# Patient Record
Sex: MALE | Employment: FULL TIME | URBAN - METROPOLITAN AREA
[De-identification: names, ages, dates, MRNs, and addresses within clinical notes are randomized per-mention and may not be internally consistent; named-entity substitution may affect disease eponyms.]

---

## 2022-06-02 ENCOUNTER — EVALUATION (OUTPATIENT)
Dept: PHYSICAL THERAPY | Facility: CLINIC | Age: 32
End: 2022-06-02
Payer: COMMERCIAL

## 2022-06-02 DIAGNOSIS — M25.562 ACUTE PAIN OF LEFT KNEE: ICD-10-CM

## 2022-06-02 DIAGNOSIS — M17.12 PRIMARY OSTEOARTHRITIS OF LEFT KNEE: Primary | ICD-10-CM

## 2022-06-02 PROCEDURE — 97161 PT EVAL LOW COMPLEX 20 MIN: CPT | Performed by: PHYSICAL THERAPIST

## 2022-06-02 PROCEDURE — 97110 THERAPEUTIC EXERCISES: CPT | Performed by: PHYSICAL THERAPIST

## 2022-06-02 NOTE — PROGRESS NOTES
PT Evaluation     Today's date: 2022  Patient name: [facility]  : 1990  MRN: [id number]  Referring provider: Jessica Garsia PA-C  Dx:   Encounter Diagnosis     ICD-10-CM    1  Primary osteoarthritis of left knee  M17 12    2  Acute pain of left knee  M25 562        Start Time: 1730  Stop Time: 1820  Total time in clinic (min): 50 minutes    Assessment  Assessment details: 33year old with 3 month hx L knee pain  X-ray + for OA, MRI + for meniscal tear  Pain with squatting, jogging, jumping and steps  Pain range 0-7/10  Strength 5/5 L knee  B squat 50% due to pain  Pain medial knee over MCL/pes anserine  L knee patellar mobility limited> + Dave with medial tibial rotation, + patellar compression  Initiated open chain quad ex and HS/quad stretching  Pt would benefit from a course of outpatient PT with goal of decreasing pain and restorring function  Impairments: activity intolerance, lacks appropriate home exercise program and pain with function  Functional limitations: twist/squat/bend painful; steps painful; difficulty sleeping  Symptom irritability: moderateUnderstanding of Dx/Px/POC: fair   Prognosis: good    Goals  STG- 4 weeks 22  1  I HEP  2 Decrease pain range to 0-4/10  3  Improve squat to 75% without pain  4  Ascend /descend steps without pain    LTG- 8 weeks 22  1  Improve pain range to 0-2/10  2  Walk, jog, jump, squat without pain  3  Work as  with minimal pain  4   Improve LEFS 5-10 points    Plan  Patient would benefit from: PT eval and skilled physical therapy  Referral necessary: No  Planned modality interventions: cryotherapy  Planned therapy interventions: manual therapy, therapeutic activities, therapeutic exercise and home exercise program  Frequency: 1x week  Duration in visits: 1  Duration in weeks: 8  Plan of Care beginning date: 2022  Plan of Care expiration date: 2022  Treatment plan discussed with: patient        Subjective Evaluation    History of Present Illness  Date of onset: 2/15/2022  Mechanism of injury: Pt reports onset of L pain several months   Pt states knee feels like it will give out at times, and has difficulty WB  Pain at work as  when jumping on and off truck  X-ray L knee showed arthritis, MRI revealed meniscal tear  Pt referred for outpatient PT  Not a recurrent problem   Quality of life: good    Pain  Current pain ratin  At best pain ratin  At worst pain ratin  Quality: dull ache and tight  Relieving factors: rest  Aggravating factors: stair climbing and running  Progression: resolved    Social Support  Steps to enter house: yes  2  Stairs in house: no   Lives in: [facility]  Lives with: spouse and young children    Employment status: working ()  Hand dominance: right      Diagnostic Tests  X-ray: abnormal  MRI studies: abnormal  Treatments  Current treatment: physical therapy  Patient Goals  Patient goals for therapy: decreased pain, increased strength, independence with ADLs/IADLs, return to sport/leisure activities and increased motion  Patient goal: decrease pain        Objective     Active Range of Motion   Left Knee   Flexion: 135 degrees     Right Knee   Flexion: 140 degrees     Mobility   Patellar Mobility:   Left Knee   Hypomobile: left medial, left lateral, left superior and left inferior    Right Knee   WFL: medial, lateral, superior and inferior    Strength/Myotome Testing     Left Knee   Normal strength    Right Knee   Normal strength    Additional Strength Details  All hip and knee strength L LE is WNL with exception of L hip abd 4/5    HT walk intact, B squat 50% due to pain    Tests     Left Knee   Positive medial Dave and patella-femoral grind  Negative posterior drawer and posterior Lachman       Ambulation   Weight-Bearing Status   Weight-Bearing Status (Left): full weight bearing   Weight-Bearing Status (Right): full weight-bearing    Assistive device used: none    Ambulation: Stairs   Ascend stairs: independent  Pattern: reciprocal  Railings: one rail  Descend stairs: independent  Pattern: reciprocal  Railings: one rail             Precautions: OA L knee      Manuals 6/2/22            STM pes anserine/HS  medially CRR            Patellar mobs CRR                                      Neuro Re-Ed                                                                                                        Ther Ex             QS  SLR  SAQ 15x  15x  15x              HT raises  squats             vectors             HS stretch  Quad stretch 2 x 15"  2 x 15"                                                                Ther Activity             Stationary bike             Leg press                                                    Modalities

## 2022-06-02 NOTE — LETTER
Thuy 3, 2022    Khanh Donovan PA-C  [address]    Patient: Xiomara Negron   YOB: 1990   Date of Visit: 2022     Encounter Diagnosis     ICD-10-CM    1  Primary osteoarthritis of left knee  M17 12    2  Acute pain of left knee  M25 562        Dear Dr Anum Arguello: Thank you for your recent referral of Xiomara Negron  Please review the attached evaluation summary from Sherif's recent visit  Please verify that you agree with the plan of care by signing the attached order  If you have any questions or concerns, please do not hesitate to call  I sincerely appreciate the opportunity to share in the care of one of your patients and hope to have another opportunity to work with you in the near future  Sincerely,    Alverto Rapp, PT      Referring Provider:      I certify that I have read the below Plan of Care and certify the need for these services furnished under this plan of treatment while under my care  Khanh Donovan PA-C  [address]  [address] [place] [address]  Via Fax: [phone]          PT Evaluation     Today's date: 2022  Patient name: Xiomara Negron  : 1990  MRN: [id number]  Referring provider: Jean Carlos Zhong PA-C  Dx:   Encounter Diagnosis     ICD-10-CM    1  Primary osteoarthritis of left knee  M17 12    2  Acute pain of left knee  M25 562        Start Time: 1730  Stop Time: 1820  Total time in clinic (min): 50 minutes    Assessment  Assessment details: 33year old with 3 month hx L knee pain  X-ray + for OA, MRI + for meniscal tear  Pain with squatting, jogging, jumping and steps  Pain range 0-7/10  Strength 5/5 L knee  B squat 50% due to pain  Pain medial knee over MCL/pes anserine  L knee patellar mobility limited> + Dave with medial tibial rotation, + patellar compression  Initiated open chain quad ex and HS/quad stretching    Pt would benefit from a course of outpatient PT with goal of decreasing pain and restorring function  Impairments: activity intolerance, lacks appropriate home exercise program and pain with function  Functional limitations: twist/squat/bend painful; steps painful; difficulty sleeping  Symptom irritability: moderateUnderstanding of Dx/Px/POC: fair   Prognosis: good    Goals  STG- 4 weeks 22  1  I HEP  2 Decrease pain range to 0-4/10  3  Improve squat to 75% without pain  4  Ascend /descend steps without pain    LTG- 8 weeks 22  1  Improve pain range to 0-2/10  2  Walk, jog, jump, squat without pain  3  Work as  with minimal pain  4  Improve LEFS 5-10 points    Plan  Patient would benefit from: PT eval and skilled physical therapy  Referral necessary: No  Planned modality interventions: cryotherapy  Planned therapy interventions: manual therapy, therapeutic activities, therapeutic exercise and home exercise program  Frequency: 1x week  Duration in visits: 1  Duration in weeks: 8  Plan of Care beginning date: 2022  Plan of Care expiration date: 2022  Treatment plan discussed with: patient        Subjective Evaluation    History of Present Illness  Date of onset: 2/15/2022  Mechanism of injury: Pt reports onset of L pain several months   Pt states knee feels like it will give out at times, and has difficulty WB  Pain at work as  when jumping on and off truck  X-ray L knee showed arthritis, MRI revealed meniscal tear  Pt referred for outpatient PT            Not a recurrent problem   Quality of life: good    Pain  Current pain ratin  At best pain ratin  At worst pain ratin  Quality: dull ache and tight  Relieving factors: rest  Aggravating factors: stair climbing and running  Progression: resolved    Social Support  Steps to enter house: yes  2  Stairs in house: no   Lives in: [facility]  Lives with: spouse and young children    Employment status: working ()  Hand dominance: right      Diagnostic Tests  X-ray: abnormal  MRI studies: abnormal  Treatments  Current treatment: physical therapy  Patient Goals  Patient goals for therapy: decreased pain, increased strength, independence with ADLs/IADLs, return to sport/leisure activities and increased motion  Patient goal: decrease pain        Objective     Active Range of Motion   Left Knee   Flexion: 135 degrees     Right Knee   Flexion: 140 degrees     Mobility   Patellar Mobility:   Left Knee   Hypomobile: left medial, left lateral, left superior and left inferior    Right Knee   WFL: medial, lateral, superior and inferior    Strength/Myotome Testing     Left Knee   Normal strength    Right Knee   Normal strength    Additional Strength Details  All hip and knee strength L LE is WNL with exception of L hip abd 4/5    HT walk intact, B squat 50% due to pain    Tests     Left Knee   Positive medial Dave and patella-femoral grind  Negative posterior drawer and posterior Lachman       Ambulation   Weight-Bearing Status   Weight-Bearing Status (Left): full weight bearing   Weight-Bearing Status (Right): full weight-bearing    Assistive device used: none    Ambulation: Stairs   Ascend stairs: independent  Pattern: reciprocal  Railings: one rail  Descend stairs: independent  Pattern: reciprocal  Railings: one rail             Precautions: OA L knee      Manuals 6/2/22            STM pes anserine/HS  medially CRR            Patellar mobs CRR                                      Neuro Re-Ed                                                                                                        Ther Ex             QS  SLR  SAQ 15x  15x  15x              HT raises  squats             vectors             HS stretch  Quad stretch 2 x 15"  2 x 15"                                                                Ther Activity             Stationary bike             Leg press                                                    Modalities

## 2022-06-09 ENCOUNTER — OFFICE VISIT (OUTPATIENT)
Dept: PHYSICAL THERAPY | Facility: CLINIC | Age: 32
End: 2022-06-09
Payer: COMMERCIAL

## 2022-06-09 DIAGNOSIS — M17.12 PRIMARY OSTEOARTHRITIS OF LEFT KNEE: Primary | ICD-10-CM

## 2022-06-09 DIAGNOSIS — M25.562 ACUTE PAIN OF LEFT KNEE: ICD-10-CM

## 2022-06-09 PROCEDURE — 97530 THERAPEUTIC ACTIVITIES: CPT

## 2022-06-09 PROCEDURE — 97110 THERAPEUTIC EXERCISES: CPT

## 2022-06-09 NOTE — PROGRESS NOTES
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, but stepping on/off truck when it stops  Objective: See treatment diary below      Assessment:  Pt jimy exer well-some VPs for technique  No inc pain with exer-though still with some L pos knee general soreness  Initiated standing vectors and shallow squats as well as sitting double and single leg press  Patient would benefit from continued PT      Plan: Continue per plan of care        Precautions: OA L knee      Manuals 22           STM pes anserine/HS  medially CRR            Patellar mobs CRR                                      Neuro Re-Ed                                                                                                                     Ther Ex             HC stretch  - 3x20"           QS  SLR  SAQ 15x  15x  15x   x20 ea           HT raises  squats  -  x20 (shallow)           vectors - GTBx 10 ea side           HS stretch  Quad stretch 2 x 15"  2 x 15" 2 x 15"  2 x 15"                                                               Ther Activity             Stationary bike-seat 17 - x5'           Leg press  -DL 50# x20  -UL R/L 30# x20                                                  Modalities

## 2022-06-16 ENCOUNTER — OFFICE VISIT (OUTPATIENT)
Dept: PHYSICAL THERAPY | Facility: CLINIC | Age: 32
End: 2022-06-16
Payer: COMMERCIAL

## 2022-06-16 DIAGNOSIS — M17.12 PRIMARY OSTEOARTHRITIS OF LEFT KNEE: ICD-10-CM

## 2022-06-16 DIAGNOSIS — M25.562 ACUTE PAIN OF LEFT KNEE: Primary | ICD-10-CM

## 2022-06-16 PROCEDURE — 97530 THERAPEUTIC ACTIVITIES: CPT | Performed by: PHYSICAL THERAPIST

## 2022-06-16 PROCEDURE — 97140 MANUAL THERAPY 1/> REGIONS: CPT | Performed by: PHYSICAL THERAPIST

## 2022-06-16 PROCEDURE — 97110 THERAPEUTIC EXERCISES: CPT | Performed by: PHYSICAL THERAPIST

## 2022-06-16 NOTE — PROGRESS NOTES
Daily Note     Today's date: 2022  Patient name: Alli Pelayo  : 1990  MRN: [id number]  Referring provider: Noah Kang PA-C  Dx:   Encounter Diagnosis     ICD-10-CM    1  Acute pain of left knee  M25 562    2  Primary osteoarthritis of left knee  M17 12                   Subjective: Less painful, pain free most of the time, @ worst 2/10  Not running and less pain at work  Objective: See treatment diary below      Assessment:  Progressing with close chain strengthening  Some pain with WB L knee with sustained knee flx  In general less apin with activity than 1 month ago  Pain medial knee at pes anserine/ MCL  [place] PT with goal of less pain with activity  Plan: Progress treatment as tolerated         Precautions: OA L knee      Manuals 22          STM pes anserine/HS  medially CRR  CRR          Patellar mobs CRR  CRR                                    Neuro Re-Ed                                                                                                                     Ther Ex             HC stretch  - 3x20" 2 x 30"          QS  SLR  SAQ 15x  15x  15x   x20 ea 20x all          HT raises  squats  -  x20 (shallow) 2 x 10 squats          vectors - GTBx 10 ea side 10x L/R          HS stretch  Quad stretch 2 x 15"  2 x 15" 2 x 15"  2 x 15" 2 x 15"  2 x 15"                                                              Ther Activity             Stationary bike-seat 17 - x5' 5' L1 seat 18          Leg press  -DL 50# x20  -UL R/L 30# x20 DL 60#  2 x 20  SL 40#  2 x 20            TM   Reverse 1 mph 3 min  5% grade 5'          Step up 6"   Up/down/side 10x L          Foam over/side   10x all          Modalities

## 2022-06-23 ENCOUNTER — OFFICE VISIT (OUTPATIENT)
Dept: PHYSICAL THERAPY | Facility: CLINIC | Age: 32
End: 2022-06-23
Payer: COMMERCIAL

## 2022-06-23 DIAGNOSIS — M25.562 ACUTE PAIN OF LEFT KNEE: ICD-10-CM

## 2022-06-23 DIAGNOSIS — M17.12 PRIMARY OSTEOARTHRITIS OF LEFT KNEE: Primary | ICD-10-CM

## 2022-06-23 PROCEDURE — 97140 MANUAL THERAPY 1/> REGIONS: CPT | Performed by: PHYSICAL THERAPIST

## 2022-06-23 PROCEDURE — 97110 THERAPEUTIC EXERCISES: CPT | Performed by: PHYSICAL THERAPIST

## 2022-06-23 PROCEDURE — 97530 THERAPEUTIC ACTIVITIES: CPT | Performed by: PHYSICAL THERAPIST

## 2022-06-23 NOTE — PROGRESS NOTES
Daily Note     Today's date: 2022  Patient name: Ravi Simpson  : 1990  MRN: [id number]  Referring provider: Pedro Pandey PA-C  Dx:   Encounter Diagnosis     ICD-10-CM    1  Primary osteoarthritis of left knee  M17 12    2  Acute pain of left knee  M25 562        Start Time: 1730  Stop Time: 1820  Total time in clinic (min): 50 minutes    Subjective: Pain free L knee currently, @ worst 2/10   "I've been more careful with my knees "    Objective: See treatment diary below      Assessment: Progressing with open and close chain strengthening  Foto 60  LEFS 53/80  Significant crepitus noted L knee with activity, no pain with creptus today  Continue PT progressing with strengthening as tolerated with goal of resuming all activity with minimal pain  Plan: Progress treatment as tolerated         Precautions: OA L knee      Manuals 22         STM pes anserine/HS  medially CRR  CRR CRR         Patellar mobs CRR  CRR CRR                          FOTO         Neuro Re-Ed                                                                                                                     Ther Ex             HC stretch  - 3x20" 2 x 30" 2 x 30"         QS  SLR  SAQ 15x  15x  15x   x20 ea 20x all 20x all  SLR 20x 1#  SAQ 30x 3#         HT raises  squats  -  x20 (shallow) 2 x 10 squats 2 x 10 squats         vectors - GTBx 10 ea side 10x L/R 10x L /R         HS stretch  Quad stretch 2 x 15"  2 x 15" 2 x 15"  2 x 15" 2 x 15"  2 x 15" 2 x 30"  2 x 30"                                                             Ther Activity             Stationary bike-seat 17 - x5' 5' L1 seat 18 5' L2         Leg press  -DL 50# x20  -UL R/L 30# x20 DL 60#  2 x 20  SL 40#  2 x 20   DL 70# 2 x 20  SL 40# L/R  2 x 20         TM   Reverse 1 mph 3 min  5% grade 5' Reverse 3 min 1 2 mph  forward 5% grade 2 5 mph         Step up 6"   Up/down/side 10x L L 10x up/side          Foam over/side   10x all 10x all Modalities

## 2022-06-30 ENCOUNTER — EVALUATION (OUTPATIENT)
Dept: PHYSICAL THERAPY | Facility: CLINIC | Age: 32
End: 2022-06-30
Payer: COMMERCIAL

## 2022-06-30 DIAGNOSIS — M17.12 PRIMARY OSTEOARTHRITIS OF LEFT KNEE: ICD-10-CM

## 2022-06-30 DIAGNOSIS — M25.562 ACUTE PAIN OF LEFT KNEE: Primary | ICD-10-CM

## 2022-06-30 PROCEDURE — 97530 THERAPEUTIC ACTIVITIES: CPT | Performed by: PHYSICAL THERAPIST

## 2022-06-30 PROCEDURE — 97140 MANUAL THERAPY 1/> REGIONS: CPT | Performed by: PHYSICAL THERAPIST

## 2022-06-30 PROCEDURE — 97164 PT RE-EVAL EST PLAN CARE: CPT | Performed by: PHYSICAL THERAPIST

## 2022-06-30 PROCEDURE — 97110 THERAPEUTIC EXERCISES: CPT | Performed by: PHYSICAL THERAPIST

## 2022-06-30 NOTE — LETTER
2022    Marie River PA-C  [address]    Patient: Ariane Lopez   YOB: 1990   Date of Visit: 2022     Encounter Diagnosis     ICD-10-CM    1  Acute pain of left knee  M25 562    2  Primary osteoarthritis of left knee  M17 12        Dear Dr Jenniffer Stern: Thank you for your recent referral of Ariane Lopez  Please review the attached evaluation summary from Sherif's recent visit  Please verify that you agree with the plan of care by signing the attached order  If you have any questions or concerns, please do not hesitate to call  I sincerely appreciate the opportunity to share in the care of one of your patients and hope to have another opportunity to work with you in the near future  Sincerely,    Diego Argueta, PT      Referring Provider:      I certify that I have read the below Plan of Care and certify the need for these services furnished under this plan of treatment while under my care  Marie River PA-C  [email or web address]  [address] [place] [address]  Via Fax: [phone]          PT Re-Evaluation     Today's date: 2022  Patient name: Ariane Lopez  : 1990  MRN: [id number]  Referring provider: Angel Mahajan PA-C  Dx:   Encounter Diagnosis     ICD-10-CM    1  Acute pain of left knee  M25 562    2  Primary osteoarthritis of left knee  M17 12        Start Time: 1730  Stop Time: 1815  Total time in clinic (min): 45 minutes    Assessment  Assessment details: 33year old with 3 month hx L knee pain  X-ray + for OA, MRI + for meniscal tear  Pain improved, range 0-5/10  Less painful with squats, steps; does not wake with knee pain  Pt has adapted his activity with less steps, less squatting  Pt noting less tenderness  Strength L knee is 5/5, pain with squat deeper than 50%  Pt to see MD for follow--up    Has been instructed in HEP, and will taper to DC from formal PT   Impairments: activity intolerance, lacks appropriate home exercise program and pain with function  Functional limitations: twist/squat/bend painful; steps painful; difficulty sleeping  Symptom irritability: moderateUnderstanding of Dx/Px/POC: fair   Prognosis: good    Goals  STG- 4 weeks 22  1  I HEP (MET)  2 Decrease pain range to 0-4/10 (progressing)  3  Improve squat to 75% without pain (not met)  4  Ascend /descend steps without pain (not met)    LTG- 8 weeks 22  1  Improve pain range to 0-2/10  2  Walk, jog, jump, squat without pain  3  Work as  with minimal pain  4  Improve LEFS 5-10 points    Plan  Patient would benefit from: PT eval and skilled physical therapy  Referral necessary: No  Planned modality interventions: cryotherapy  Planned therapy interventions: manual therapy, therapeutic activities, therapeutic exercise and home exercise program  Frequency: 1x week  Duration in visits: 1  Duration in weeks: 4  Plan of Care beginning date: 2022  Plan of Care expiration date: 2022  Treatment plan discussed with: patient        Subjective Evaluation    History of Present Illness  Date of onset: 2/15/2022  Mechanism of injury: 22- Some improvement noted, patient states he changed his activity level  Pain range 0-5/10    Pt reports onset of L pain several months   Pt states knee feels like it will give out at times, and has difficulty WB  Pain at work as  when jumping on and off truck  X-ray L knee showed arthritis, MRI revealed meniscal tear  Pt referred for outpatient PT            Not a recurrent problem   Quality of life: good    Pain  Current pain ratin  At best pain ratin  At worst pain ratin  Quality: dull ache and tight  Relieving factors: rest  Aggravating factors: stair climbing and running  Progression: resolved    Social Support  Steps to enter house: yes  2  Stairs in house: no   Lives in: [facility]  Lives with: spouse and young children    Employment status: working ()  Hand dominance: right      Diagnostic Tests  X-ray: abnormal  MRI studies: abnormal  Treatments  Current treatment: physical therapy  Patient Goals  Patient goals for therapy: decreased pain, increased strength, independence with ADLs/IADLs, return to sport/leisure activities and increased motion  Patient goal: decrease pain        Objective     Tenderness     Additional Tenderness Details  Painful medial L knee over MCL and pes anserine    Active Range of Motion   Left Knee   Flexion: 138 degrees     Right Knee   Flexion: 140 degrees     Mobility   Patellar Mobility:   Left Knee   WFL: medial and lateral    Hypomobile: left superior and left inferior    Right Knee   WFL: medial, lateral, superior and inferior    Strength/Myotome Testing     Left Knee   Normal strength    Right Knee   Normal strength    Additional Strength Details  All hip and knee strength L LE is WNL, L hip abd improved to 5/5  HT walk intact, B squat 50% due to pain    Tests     Left Knee   Positive medial Dave and patella-femoral grind  Negative posterior drawer and posterior Lachman       Ambulation   Weight-Bearing Status   Weight-Bearing Status (Left): full weight bearing   Weight-Bearing Status (Right): full weight-bearing    Assistive device used: none    Ambulation: Stairs   Ascend stairs: independent  Pattern: reciprocal  Railings: one rail  Descend stairs: independent  Pattern: reciprocal  Railings: one rail             Precautions: OA L knee      Manuals 6/2/22 6/9/22 6/16 6/23 6/30        STM pes anserine/HS  medially CRR  CRR CRR CRR        Patellar mobs CRR  CRR CRR CRR                         FOTO RE        Neuro Re-Ed                                                                                                                     Ther Ex             HC stretch  - 3x20" 2 x 30" 2 x 30" 2 x 30"        QS  SLR  SAQ 15x  15x  15x   x20 ea 20x all 20x all  SLR 20x 1#  SAQ 30x 3# 20x all    SLR 20x 2#  SAQ 20x 4#        HT raises  squats  -  x20 (shallow) 2 x 10 squats 2 x 10 squats squats 2 x 10        vectors - GTBx 10 ea side 10x L/R 10x L /R 10x        HS stretch  Quad stretch 2 x 15"  2 x 15" 2 x 15"  2 x 15" 2 x 15"  2 x 15" 2 x 30"  2 x 30" 2 x 30"    2 x 30"                                                            Ther Activity             Stationary bike-seat 17 - x5' 5' L1 seat 18 5' L2 5' L2        Leg press  -DL 50# x20  -UL R/L 30# x20 DL 60#  2 x 20  SL 40#  2 x 20   DL 70# 2 x 20  SL 40# L/R  2 x 20 DL 70#  SL 40 #         TM   Reverse 1 mph 3 min  5% grade 5' Reverse 3 min 1 2 mph  forward 5% grade 2 5 mph Reverse 3 min 1 2 mph  forward 5% grade 2 5 mph        Step up 6"   Up/down/side 10x L L 10x up/side  Up/side 10x/10x        Foam over/side   10x all 10x all 2 x 10 all        Modalities

## 2022-06-30 NOTE — PROGRESS NOTES
PT Re-Evaluation     Today's date: 2022  Patient name: Tiffanie Quinones  : 1990  MRN: [id number]  Referring provider: Lauro Lizarraga PA-C  Dx:   Encounter Diagnosis     ICD-10-CM    1  Acute pain of left knee  M25 562    2  Primary osteoarthritis of left knee  M17 12        Start Time: 1730  Stop Time: 1815  Total time in clinic (min): 45 minutes    Assessment  Assessment details: 33year old with 3 month hx L knee pain  X-ray + for OA, MRI + for meniscal tear  Pain improved, range 0-5/10  Less painful with squats, steps; does not wake with knee pain  Pt has adapted his activity with less steps, less squatting  Pt noting less tenderness  Strength L knee is 5/5, pain with squat deeper than 50%  Pt to see MD for follow--up  Has been instructed in HEP, and will taper to DC from formal PT  Impairments: activity intolerance, lacks appropriate home exercise program and pain with function  Functional limitations: twist/squat/bend painful; steps painful; difficulty sleeping  Symptom irritability: moderateUnderstanding of Dx/Px/POC: fair   Prognosis: good    Goals  STG- 4 weeks 22  1  I HEP (MET)  2 Decrease pain range to 0-4/10 (progressing)  3  Improve squat to 75% without pain (not met)  4  Ascend /descend steps without pain (not met)    LTG- 8 weeks 22  1  Improve pain range to 0-2/10  2  Walk, jog, jump, squat without pain  3  Work as  with minimal pain  4   Improve LEFS 5-10 points    Plan  Patient would benefit from: PT eval and skilled physical therapy  Referral necessary: No  Planned modality interventions: cryotherapy  Planned therapy interventions: manual therapy, therapeutic activities, therapeutic exercise and home exercise program  Frequency: 1x week  Duration in visits: 1  Duration in weeks: 4  Plan of Care beginning date: 2022  Plan of Care expiration date: 2022  Treatment plan discussed with: patient        Subjective Evaluation    History of Present Pts wife called in today, they were unable to buy the Rectiv medication for pts hemorrhoids. It cost $650 before insurance and $023 after. Pt is in extreme amount of pain, can not sit or do anything comfortably. Pt does have a appt with a specialist but that is just a consult & it is 2wks away. Pts wife states they've tried tux wipes, Dr. Lillie Haile, Sitz bath & suppositories without relief. Can anything else be advised for pt? Please return call to wife, pt is at work.   [phone] Illness  Date of onset: 2/15/2022  Mechanism of injury: 22- Some improvement noted, patient states he changed his activity level  Pain range 0-5/10    Pt reports onset of L pain several months   Pt states knee feels like it will give out at times, and has difficulty WB  Pain at work as  when jumping on and off truck  X-ray L knee showed arthritis, MRI revealed meniscal tear  Pt referred for outpatient PT  Not a recurrent problem   Quality of life: good    Pain  Current pain ratin  At best pain ratin  At worst pain ratin  Quality: dull ache and tight  Relieving factors: rest  Aggravating factors: stair climbing and running  Progression: resolved    Social Support  Steps to enter house: yes  2  Stairs in house: no   Lives in: [facility]  Lives with: spouse and young children    Employment status: working ()  Hand dominance: right      Diagnostic Tests  X-ray: abnormal  MRI studies: abnormal  Treatments  Current treatment: physical therapy  Patient Goals  Patient goals for therapy: decreased pain, increased strength, independence with ADLs/IADLs, return to sport/leisure activities and increased motion  Patient goal: decrease pain        Objective     Tenderness     Additional Tenderness Details  Painful medial L knee over MCL and pes anserine    Active Range of Motion   Left Knee   Flexion: 138 degrees     Right Knee   Flexion: 140 degrees     Mobility   Patellar Mobility:   Left Knee   WFL: medial and lateral    Hypomobile: left superior and left inferior    Right Knee   WFL: medial, lateral, superior and inferior    Strength/Myotome Testing     Left Knee   Normal strength    Right Knee   Normal strength    Additional Strength Details  All hip and knee strength L LE is WNL, L hip abd improved to 5/5  HT walk intact, B squat 50% due to pain    Tests     Left Knee   Positive medial Dave and patella-femoral grind     Negative posterior drawer and posterior Lachman       Ambulation   Weight-Bearing Status   Weight-Bearing Status (Left): full weight bearing   Weight-Bearing Status (Right): full weight-bearing    Assistive device used: none    Ambulation: Stairs   Ascend stairs: independent  Pattern: reciprocal  Railings: one rail  Descend stairs: independent  Pattern: reciprocal  Railings: one rail             Precautions: OA L knee      Manuals 6/2/22 6/9/22 6/16 6/23 6/30        STM pes anserine/HS  medially CRR  CRR CRR CRR        Patellar mobs CRR  CRR CRR CRR                         FOTO RE        Neuro Re-Ed                                                                                                                     Ther Ex             HC stretch  - 3x20" 2 x 30" 2 x 30" 2 x 30"        QS  SLR  SAQ 15x  15x  15x   x20 ea 20x all 20x all  SLR 20x 1#  SAQ 30x 3# 20x all    SLR 20x 2#  SAQ 20x 4#        HT raises  squats  -  x20 (shallow) 2 x 10 squats 2 x 10 squats squats 2 x 10        vectors - GTBx 10 ea side 10x L/R 10x L /R 10x        HS stretch  Quad stretch 2 x 15"  2 x 15" 2 x 15"  2 x 15" 2 x 15"  2 x 15" 2 x 30"  2 x 30" 2 x 30"    2 x 30"                                                            Ther Activity             Stationary bike-seat 17 - x5' 5' L1 seat 18 5' L2 5' L2        Leg press  -DL 50# x20  -UL R/L 30# x20 DL 60#  2 x 20  SL 40#  2 x 20   DL 70# 2 x 20  SL 40# L/R  2 x 20 DL 70#  SL 40 #         TM   Reverse 1 mph 3 min  5% grade 5' Reverse 3 min 1 2 mph  forward 5% grade 2 5 mph Reverse 3 min 1 2 mph  forward 5% grade 2 5 mph        Step up 6"   Up/down/side 10x L L 10x up/side  Up/side 10x/10x        Foam over/side   10x all 10x all 2 x 10 all        Modalities